# Patient Record
Sex: FEMALE | Race: BLACK OR AFRICAN AMERICAN | NOT HISPANIC OR LATINO
[De-identification: names, ages, dates, MRNs, and addresses within clinical notes are randomized per-mention and may not be internally consistent; named-entity substitution may affect disease eponyms.]

---

## 2019-08-01 NOTE — PATIENT DISCUSSION
CATARACTS, OU: VISUALLY SIGNIFICANT. SURGERY INDICATED. PATIENT WISHES TO WAIT AT THIS TIME. GLASSES PRESCRIPTION GIVEN. PATIENT TO CALL BACK IF THEY DECIDE TO PROCEED WITH SURGERY WITHIN 6 MONTHS. VISION WOULD NOT BE PERFECT DUE TO DIABETES BEING HIGH. LAST A1C 10.9-11. OTHERWISE, FOLLOW AS SCHEDULED.

## 2021-01-05 NOTE — PATIENT DISCUSSION
CATARACTS, OU: VISUALLY SIGNIFICANT. SURGERY INDICATED.  WILL FURTHER DISCUSS AFTER BLOOD SUGAR AND BLOOD PRESSURE ARE UNDER BETTER CONTROL

## 2021-01-05 NOTE — PATIENT DISCUSSION
NEW LATERAL RECTUS PALSY OD:  PATIENT WITH UNCONTROLLED BLOOD SUGAR. EXPLAINED THAT PATIENT NEEDS TO RETURN TO PRIMARY CARE IMMEDIATELY FOR BETTER BLOOD PRESSURE/BLOOD SUGAR CONTROL. PATIENT HAS NOT BEEN TAKING ANY HTN MEDS, AND HAS NOT FOLLOWED UP. EXPLAINED THAT THIS WILL TAKE MONTHS TO RESOLVE.

## 2022-10-31 ENCOUNTER — COMPREHENSIVE EXAM (OUTPATIENT)
Age: 62
End: 2022-10-31

## 2022-10-31 DIAGNOSIS — Z01.00: ICD-10-CM

## 2022-10-31 PROCEDURE — 92499OP2 OPTOMAP RETINAL SCREENING BOTH EYES

## 2022-10-31 PROCEDURE — 92015 DETERMINE REFRACTIVE STATE: CPT

## 2022-10-31 PROCEDURE — 92014 COMPRE OPH EXAM EST PT 1/>: CPT

## 2022-10-31 ASSESSMENT — VISUAL ACUITY
OU_SC: 20/20
OD_CC: 20/20
OU_SC: 20/40
OD_SC: 20/40
OS_SC: 20/20
OS_SC: 20/40
OS_CC: 20/20
OD_SC: 20/50
OU_CC: 20/20

## 2022-10-31 ASSESSMENT — KERATOMETRY
OS_K1POWER_DIOPTERS: 42.50
OS_K2POWER_DIOPTERS: 43.00
OD_K1POWER_DIOPTERS: 42.00
OD_AXISANGLE_DEGREES: 105
OS_AXISANGLE2_DEGREES: 150
OS_AXISANGLE_DEGREES: 60
OD_K2POWER_DIOPTERS: 43.50
OD_AXISANGLE2_DEGREES: 15

## 2022-10-31 ASSESSMENT — TONOMETRY
OD_IOP_MMHG: 14
OS_IOP_MMHG: 15

## 2023-04-04 ENCOUNTER — EMERGENCY VISIT (OUTPATIENT)
Dept: URBAN - METROPOLITAN AREA CLINIC 1 | Facility: CLINIC | Age: 63
End: 2023-04-04

## 2023-04-04 DIAGNOSIS — H16.143: ICD-10-CM

## 2023-04-04 DIAGNOSIS — H25.813: ICD-10-CM

## 2023-04-04 DIAGNOSIS — H43.812: ICD-10-CM

## 2023-04-04 DIAGNOSIS — H40.033: ICD-10-CM

## 2023-04-04 DIAGNOSIS — H04.123: ICD-10-CM

## 2023-04-04 PROCEDURE — 92015 DETERMINE REFRACTIVE STATE: CPT

## 2023-04-04 PROCEDURE — 92014 COMPRE OPH EXAM EST PT 1/>: CPT

## 2023-04-04 ASSESSMENT — KERATOMETRY
OD_AXISANGLE_DEGREES: 105
OS_AXISANGLE_DEGREES: 60
OS_K1POWER_DIOPTERS: 42.50
OD_K2POWER_DIOPTERS: 43.50
OS_K2POWER_DIOPTERS: 43.00
OD_K1POWER_DIOPTERS: 42.00
OS_AXISANGLE2_DEGREES: 150
OD_AXISANGLE2_DEGREES: 15

## 2023-04-04 ASSESSMENT — VISUAL ACUITY
OS_CC: 20/25
OD_CC: 20/25

## 2023-04-04 ASSESSMENT — TONOMETRY
OS_IOP_MMHG: 15
OD_IOP_MMHG: 15

## 2023-04-14 ENCOUNTER — PRE-OP/H&P (OUTPATIENT)
Dept: URBAN - METROPOLITAN AREA CLINIC 1 | Facility: CLINIC | Age: 63
End: 2023-04-14

## 2023-04-14 VITALS
WEIGHT: 188 LBS | HEART RATE: 44 BPM | HEIGHT: 61 IN | SYSTOLIC BLOOD PRESSURE: 117 MMHG | BODY MASS INDEX: 35.5 KG/M2 | DIASTOLIC BLOOD PRESSURE: 60 MMHG

## 2023-04-14 DIAGNOSIS — H25.813: ICD-10-CM

## 2023-04-14 PROCEDURE — 99499 UNLISTED E&M SERVICE: CPT

## 2023-04-14 PROCEDURE — 92025 CPTRIZED CORNEAL TOPOGRAPHY: CPT

## 2023-04-14 PROCEDURE — 92136 OPHTHALMIC BIOMETRY: CPT

## 2023-04-14 ASSESSMENT — VISUAL ACUITY
OS_BAT: 20/40
OD_CC: J1
OD_SC: 20/30+2
OD_SC: J3-3
OS_SC: 20/30+2
OS_CC: 20/25
OD_CC: 20/25
OD_BAT: 20/40
OS_CC: J1
OS_SC: J3-3

## 2023-04-14 ASSESSMENT — KERATOMETRY
OS_AXISANGLE2_DEGREES: 150
OD_AXISANGLE_DEGREES: 105
OD_K2POWER_DIOPTERS: 43.50
OS_K1POWER_DIOPTERS: 42.50
OD_K1POWER_DIOPTERS: 42.00
OS_AXISANGLE_DEGREES: 60
OS_K2POWER_DIOPTERS: 43.00
OD_AXISANGLE2_DEGREES: 15

## 2023-04-14 ASSESSMENT — TONOMETRY
OS_IOP_MMHG: 17
OD_IOP_MMHG: 17

## 2023-04-26 ENCOUNTER — SURGERY/PROCEDURE (OUTPATIENT)
Dept: URBAN - METROPOLITAN AREA SURGERY 1 | Facility: SURGERY | Age: 63
End: 2023-04-26

## 2023-04-26 DIAGNOSIS — H25.812: ICD-10-CM

## 2023-04-26 PROCEDURE — 66984 XCAPSL CTRC RMVL W/O ECP: CPT

## 2023-04-26 ASSESSMENT — KERATOMETRY
OS_AXISANGLE2_DEGREES: 150
OS_K2POWER_DIOPTERS: 43.00
OS_AXISANGLE_DEGREES: 60
OD_K2POWER_DIOPTERS: 43.50
OD_K1POWER_DIOPTERS: 42.00
OD_AXISANGLE_DEGREES: 105
OS_K1POWER_DIOPTERS: 42.50
OD_AXISANGLE2_DEGREES: 15

## 2023-04-27 ENCOUNTER — POST-OP (OUTPATIENT)
Dept: URBAN - METROPOLITAN AREA CLINIC 2 | Facility: CLINIC | Age: 63
End: 2023-04-27

## 2023-04-27 DIAGNOSIS — Z96.1: ICD-10-CM

## 2023-04-27 PROCEDURE — 99024 POSTOP FOLLOW-UP VISIT: CPT

## 2023-04-27 RX ORDER — ACETAZOLAMIDE 500 MG/1: 1 CAPSULE ORAL TWICE A DAY

## 2023-04-27 ASSESSMENT — KERATOMETRY
OD_AXISANGLE_DEGREES: 105
OS_AXISANGLE2_DEGREES: 150
OD_K2POWER_DIOPTERS: 43.50
OD_AXISANGLE2_DEGREES: 15
OS_K2POWER_DIOPTERS: 43.00
OD_K1POWER_DIOPTERS: 42.00
OS_K1POWER_DIOPTERS: 42.50
OS_AXISANGLE_DEGREES: 60

## 2023-04-27 ASSESSMENT — TONOMETRY: OS_IOP_MMHG: 23

## 2023-04-27 ASSESSMENT — VISUAL ACUITY: OS_SC: 20/20

## 2023-05-04 ENCOUNTER — POST OP/EVAL OF SECOND EYE (OUTPATIENT)
Dept: URBAN - METROPOLITAN AREA CLINIC 1 | Facility: CLINIC | Age: 63
End: 2023-05-04

## 2023-05-04 VITALS
HEIGHT: 61 IN | SYSTOLIC BLOOD PRESSURE: 117 MMHG | WEIGHT: 188 LBS | HEART RATE: 90 BPM | DIASTOLIC BLOOD PRESSURE: 60 MMHG | BODY MASS INDEX: 35.5 KG/M2

## 2023-05-04 DIAGNOSIS — H25.811: ICD-10-CM

## 2023-05-04 PROCEDURE — 92136 OPHTHALMIC BIOMETRY: CPT

## 2023-05-04 PROCEDURE — 92025 CPTRIZED CORNEAL TOPOGRAPHY: CPT

## 2023-05-04 PROCEDURE — 99024 POSTOP FOLLOW-UP VISIT: CPT

## 2023-05-04 ASSESSMENT — KERATOMETRY
OS_AXISANGLE_DEGREES: 60
OD_K2POWER_DIOPTERS: 43.50
OS_AXISANGLE2_DEGREES: 150
OD_K1POWER_DIOPTERS: 42.00
OS_K1POWER_DIOPTERS: 42.50
OD_AXISANGLE_DEGREES: 105
OS_K2POWER_DIOPTERS: 43.00
OD_AXISANGLE2_DEGREES: 15

## 2023-05-04 ASSESSMENT — VISUAL ACUITY: OS_SC: 20/30

## 2023-05-04 ASSESSMENT — TONOMETRY: OS_IOP_MMHG: 16

## 2024-05-16 ENCOUNTER — COMPREHENSIVE EXAM (OUTPATIENT)
Facility: LOCATION | Age: 64
End: 2024-05-16

## 2024-05-16 DIAGNOSIS — Z01.00: ICD-10-CM

## 2024-05-16 PROCEDURE — 92015 DETERMINE REFRACTIVE STATE: CPT

## 2024-05-16 PROCEDURE — 92499OP2 OPTOMAP RETINAL SCREENING BOTH EYES

## 2024-05-16 PROCEDURE — 92014 COMPRE OPH EXAM EST PT 1/>: CPT

## 2024-05-16 ASSESSMENT — VISUAL ACUITY
OD_CC: 20/40-2
OU_CC: 20/25
OS_CC: 20/25
OD_CC: 20/25
OU_CC: 20/30
OS_CC: 20/40

## 2024-05-16 ASSESSMENT — TONOMETRY
OS_IOP_MMHG: 14
OD_IOP_MMHG: 18

## 2024-12-02 ENCOUNTER — COMPREHENSIVE EXAM (OUTPATIENT)
Age: 64
End: 2024-12-02

## 2024-12-02 DIAGNOSIS — H40.031: ICD-10-CM

## 2024-12-02 DIAGNOSIS — H16.143: ICD-10-CM

## 2024-12-02 DIAGNOSIS — H26.492: ICD-10-CM

## 2024-12-02 DIAGNOSIS — H04.123: ICD-10-CM

## 2024-12-02 DIAGNOSIS — H25.811: ICD-10-CM

## 2024-12-02 PROCEDURE — 99214 OFFICE O/P EST MOD 30 MIN: CPT

## 2025-06-05 ENCOUNTER — FOLLOW UP (OUTPATIENT)
Age: 65
End: 2025-06-05

## 2025-06-05 DIAGNOSIS — H25.811: ICD-10-CM

## 2025-06-05 DIAGNOSIS — H26.492: ICD-10-CM

## 2025-06-05 PROCEDURE — 92012 INTRM OPH EXAM EST PATIENT: CPT
